# Patient Record
Sex: MALE | Race: WHITE | NOT HISPANIC OR LATINO | URBAN - METROPOLITAN AREA
[De-identification: names, ages, dates, MRNs, and addresses within clinical notes are randomized per-mention and may not be internally consistent; named-entity substitution may affect disease eponyms.]

---

## 2019-09-10 NOTE — PATIENT DISCUSSION
We discussed surgical options of excision without or with adjunct therapy including, but not limited to, conjunctival auto-graft, oral mucosal graft, Mitomycin-C application, amniotic membrane graft, stem cell transplant, and Beta radiation. Rec exc of pterygium w/free conj autograft, glue and sutures, in conjunction with central SK and excision of corneal nodules OS. PBB pt e, u, & a.

## 2019-09-16 NOTE — PATIENT DISCUSSION
WLS talked to pt and reassured the below instructions from [facility] exam and advised to keep doing everything he instructed and call if any changes.

## 2019-10-16 NOTE — PATIENT DISCUSSION
WLS talked to pt and reassured the below instructions from [facility] exam and advised to keep doing everything he instructed and call if any changes.

## 2021-11-01 ENCOUNTER — ESTABLISHED COMPREHENSIVE EXAM (OUTPATIENT)
Dept: URBAN - METROPOLITAN AREA CLINIC 10 | Facility: CLINIC | Age: 41
End: 2021-11-01

## 2021-11-01 DIAGNOSIS — H52.03: ICD-10-CM

## 2021-11-01 DIAGNOSIS — D31.21: ICD-10-CM

## 2021-11-01 PROCEDURE — 92015 DETERMINE REFRACTIVE STATE: CPT

## 2021-11-01 PROCEDURE — 92014 COMPRE OPH EXAM EST PT 1/>: CPT

## 2021-11-01 PROCEDURE — 92250 FUNDUS PHOTOGRAPHY W/I&R: CPT

## 2021-11-01 PROCEDURE — 92310C CONTACT LENS 75

## 2021-11-01 ASSESSMENT — VISUAL ACUITY
OD_CC: 20/25
OS_CC: 20/20-1
OU_SC: 20/400
OU_CC: 20/20-2
OD_SC: 20/400
OS_SC: 20/400

## 2021-11-01 ASSESSMENT — KERATOMETRY
OD_K2POWER_DIOPTERS: 44.50
OS_K2POWER_DIOPTERS: 45.00
OD_K1POWER_DIOPTERS: 41.50
OS_K1POWER_DIOPTERS: 42.75
OD_AXISANGLE_DEGREES: 180
OS_AXISANGLE_DEGREES: 170
OS_K2POWER_DIOPTERS: 45.25
OD_K2POWER_DIOPTERS: 45.25
OD_AXISANGLE2_DEGREES: 90
OS_AXISANGLE2_DEGREES: 80
OS_AXISANGLE2_DEGREES: 080

## 2021-11-01 ASSESSMENT — TONOMETRY
OD_IOP_MMHG: 14
OS_IOP_MMHG: 15

## 2022-08-30 NOTE — PATIENT DISCUSSION
Artificial Tears: Refresh, Systane, or Blink, NP 1 gtt OU QID x 2 weeks, afterwards 3-4x/day. Introduction Text (Please End With A Colon): The following procedure was deferred: Detail Level: Detailed Procedure To Be Performed At Next Visit: Excision X Size Of Lesion In Cm (Optional): 0